# Patient Record
Sex: FEMALE | URBAN - METROPOLITAN AREA
[De-identification: names, ages, dates, MRNs, and addresses within clinical notes are randomized per-mention and may not be internally consistent; named-entity substitution may affect disease eponyms.]

---

## 2021-12-09 ENCOUNTER — OFFICE VISIT (OUTPATIENT)
Dept: URBAN - METROPOLITAN AREA CLINIC 51 | Facility: CLINIC | Age: 56
End: 2021-12-09
Payer: COMMERCIAL

## 2021-12-09 DIAGNOSIS — H52.4 PRESBYOPIA: ICD-10-CM

## 2021-12-09 DIAGNOSIS — H47.392 OTHER DISORDERS OF OPTIC DISC, LEFT EYE: ICD-10-CM

## 2021-12-09 DIAGNOSIS — H04.123 TEAR FILM INSUFFICIENCY OF BILATERAL LACRIMAL GLANDS: Primary | ICD-10-CM

## 2021-12-09 DIAGNOSIS — H43.313 VITREOUS MEMBRANES AND STRANDS, BILATERAL: ICD-10-CM

## 2021-12-09 PROCEDURE — 92004 COMPRE OPH EXAM NEW PT 1/>: CPT | Performed by: OPTOMETRIST

## 2021-12-09 PROCEDURE — 92134 CPTRZ OPH DX IMG PST SGM RTA: CPT | Performed by: OPTOMETRIST

## 2021-12-09 ASSESSMENT — KERATOMETRY
OS: 41.70
OD: 41.82

## 2021-12-09 ASSESSMENT — VISUAL ACUITY
OS: 20/20
OD: 20/20

## 2021-12-09 ASSESSMENT — INTRAOCULAR PRESSURE
OD: 15
OS: 15

## 2021-12-09 NOTE — IMPRESSION/PLAN
Impression: Presbyopia: H52.4. Plan: Discussed the option of continuing with OTC reading glasses vs. full time SRx. Patient defers new SRx. Discussed +1.50 OTC readers would be for the computer/intermediate and increase power for reading/near vision (+2.00/+2.25). Patient inquired about surgical intervention. Discussed the option of refractive lensectomy. Offered appointment for surgical consult, patient defers. Can refer to Dr. Sabra Verduzco in [place] for refractive consult in the future if patient wishes.

## 2021-12-09 NOTE — IMPRESSION/PLAN
Impression: Tear film insufficiency of bilateral lacrimal glands: H04.123. *MGD OU Plan: Recommend AT's at least 1-2 times a day or more OU. Can use more often when reading or on the computer for long periods of time. Focus on complete blinks.

## 2021-12-09 NOTE — IMPRESSION/PLAN
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 Plan: Inferior myelination OS. Monitor.